# Patient Record
Sex: FEMALE | Race: WHITE | NOT HISPANIC OR LATINO
[De-identification: names, ages, dates, MRNs, and addresses within clinical notes are randomized per-mention and may not be internally consistent; named-entity substitution may affect disease eponyms.]

---
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
## 2024-03-22 ENCOUNTER — APPOINTMENT (OUTPATIENT)
Dept: GASTROENTEROLOGY | Facility: CLINIC | Age: 19
End: 2024-03-22
Payer: OTHER GOVERNMENT

## 2024-03-22 VITALS
RESPIRATION RATE: 14 BRPM | BODY MASS INDEX: 25.52 KG/M2 | SYSTOLIC BLOOD PRESSURE: 122 MMHG | WEIGHT: 144 LBS | HEART RATE: 66 BPM | HEIGHT: 63 IN | DIASTOLIC BLOOD PRESSURE: 76 MMHG | OXYGEN SATURATION: 99 %

## 2024-03-22 DIAGNOSIS — K21.9 GASTRO-ESOPHAGEAL REFLUX DISEASE W/OUT ESOPHAGITIS: ICD-10-CM

## 2024-03-22 PROCEDURE — 99204 OFFICE O/P NEW MOD 45 MIN: CPT

## 2024-03-22 NOTE — HISTORY OF PRESENT ILLNESS
[FreeTextEntry1] : DICTATION INACTIVE  18 yr female chronic regurgitation without vomiting, without heartburn Can bring up solid food even hours after a meal May be slightly more prominent with greasy foods No improvement with pepcid trial recent EGD grossly normal Pathology report unavailable, but follow up office note questions lymphocytic esophagitis, and patient was recommended an allergy inhaler to "swollow" Patient denies any dysphagia  Patient with chronic, daily nausea as well  SH Sophomore at [employer]

## 2024-03-22 NOTE — ASSESSMENT
[FreeTextEntry1] : Regurgitation without heartburn ddx gastroparesis, esophageal dysmotility, rumination syndrome patient understands differential agrees with beginning GI functional eval patient given a letter to bring back to admin at [place] Required to have testing there IF available If not, will arrange here, closer to home Further rec to follow

## 2025-07-03 ENCOUNTER — APPOINTMENT (OUTPATIENT)
Dept: OBGYN | Facility: CLINIC | Age: 20
End: 2025-07-03
Payer: OTHER GOVERNMENT

## 2025-07-03 VITALS
WEIGHT: 135 LBS | HEIGHT: 63 IN | DIASTOLIC BLOOD PRESSURE: 61 MMHG | BODY MASS INDEX: 23.92 KG/M2 | SYSTOLIC BLOOD PRESSURE: 109 MMHG

## 2025-07-03 PROCEDURE — 99385 PREV VISIT NEW AGE 18-39: CPT

## 2025-07-03 RX ORDER — NORETHINDRONE ACETATE AND ETHINYL ESTRADIOL AND FERROUS FUMARATE 1MG-20(21)
1-20 KIT ORAL DAILY
Qty: 3 | Refills: 3 | Status: ACTIVE | COMMUNITY
Start: 2025-07-03 | End: 1900-01-01

## 2025-07-07 LAB
C TRACH RRNA SPEC QL NAA+PROBE: NOT DETECTED
N GONORRHOEA RRNA SPEC QL NAA+PROBE: NOT DETECTED
SOURCE TP AMPLIFICATION: NORMAL

## 2025-07-10 LAB — CYTOLOGY CVX/VAG DOC THIN PREP: NORMAL
